# Patient Record
Sex: MALE | Race: OTHER | HISPANIC OR LATINO | URBAN - METROPOLITAN AREA
[De-identification: names, ages, dates, MRNs, and addresses within clinical notes are randomized per-mention and may not be internally consistent; named-entity substitution may affect disease eponyms.]

---

## 2017-05-16 ENCOUNTER — EMERGENCY (EMERGENCY)
Facility: HOSPITAL | Age: 37
LOS: 1 days | Discharge: HOME | End: 2017-05-16
Attending: EMERGENCY MEDICINE
Payer: SELF-PAY

## 2017-05-16 VITALS
DIASTOLIC BLOOD PRESSURE: 68 MMHG | RESPIRATION RATE: 18 BRPM | OXYGEN SATURATION: 97 % | SYSTOLIC BLOOD PRESSURE: 119 MMHG | HEIGHT: 68 IN | TEMPERATURE: 98 F | WEIGHT: 195.11 LBS | HEART RATE: 66 BPM

## 2017-05-16 DIAGNOSIS — S76.011A STRAIN OF MUSCLE, FASCIA AND TENDON OF RIGHT HIP, INITIAL ENCOUNTER: ICD-10-CM
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
## 2017-05-16 DIAGNOSIS — Z87.891 PERSONAL HISTORY OF NICOTINE DEPENDENCE: ICD-10-CM
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
## 2017-05-16 PROCEDURE — 99283 EMERGENCY DEPT VISIT LOW MDM: CPT

## 2017-05-16 PROCEDURE — 96372 THER/PROPH/DIAG INJ SC/IM: CPT

## 2017-05-16 PROCEDURE — 99283 EMERGENCY DEPT VISIT LOW MDM: CPT | Mod: 25

## 2017-05-16 RX ORDER — KETOROLAC TROMETHAMINE 30 MG/ML
30 SYRINGE (ML) INJECTION ONCE
Qty: 0 | Refills: 0 | Status: DISCONTINUED | OUTPATIENT
Start: 2017-05-16 | End: 2017-05-16

## 2017-05-16 RX ORDER — IBUPROFEN 200 MG
1 TABLET ORAL
Qty: 20 | Refills: 0 | OUTPATIENT
Start: 2017-05-16 | End: 2017-05-21

## 2017-05-16 RX ORDER — DIAZEPAM 5 MG
5 TABLET ORAL ONCE
Qty: 0 | Refills: 0 | Status: DISCONTINUED | OUTPATIENT
Start: 2017-05-16 | End: 2017-05-16

## 2017-05-16 RX ORDER — METHOCARBAMOL 500 MG/1
2 TABLET, FILM COATED ORAL
Qty: 24 | Refills: 0 | OUTPATIENT
Start: 2017-05-16 | End: 2017-05-20

## 2017-05-16 RX ADMIN — Medication 30 MILLIGRAM(S): at 13:56

## 2017-05-16 RX ADMIN — Medication 5 MILLIGRAM(S): at 13:56

## 2017-05-16 RX ADMIN — Medication 30 MILLIGRAM(S): at 15:45

## 2017-05-16 NOTE — ED PROVIDER NOTE - CONSTITUTIONAL, MLM
normal... Appears uncomfortable, well nourished, awake, alert, oriented to person, place, time/situation and in no apparent distress.

## 2017-05-16 NOTE — ED PROVIDER NOTE - OBJECTIVE STATEMENT
36 year-old male, no significant PMHx, presents with cc right groin/hip pain since yesterday. Yesterday, was lifting up refrigerators and carrying them downstairs. Felt gradual onset of progressively worsening sharp right groin pain, radiating to right lateral hip. Pain is continuous, worse with walking, hip ROM or lifting leg. No alleviating factors. Denies back pain, numbness/tingling, weakness, urinary/bowel dysfunction, testicular pain, bulging mass, increase in pain with coughing, fall or any other complaints. 36 year-old male, no significant PMHx, presents with cc right groin/hip pain since yesterday. Yesterday, was lifting up refrigerators and carrying them downstairs and bending low to get through doorway. Felt gradual onset of progressively worsening sharp right groin pain, radiating to right lateral hip. Pain is continuous, worse with walking, hip ROM or lifting leg. No alleviating factors. Denies back pain, numbness/tingling, weakness, urinary/bowel dysfunction, testicular pain, bulging mass, increase in pain with coughing, fall or any other complaints.

## 2017-05-16 NOTE — ED PROVIDER NOTE - PROGRESS NOTE DETAILS
Patient feels better. Explained to patient that patient needs to follow up with PMD and ortho through care coordinator for re-assessment and avoid work and heavy lifting until cleared by PMD or ortho. Will give Rx for IBU and RObaxin. Pt is well appearing walking with steady gait, stable for discharge and follow up without fail with medical doctor. I had a detailed discussion with the patient and/or guardian regarding the historical points, exam findings, and any diagnostic results supporting the discharge diagnosis. Pt educated on care and need for follow up. Strict return instructions and red flag signs and symptoms discussed with patient. Questions answered. Pt shows understanding of discharge information and agrees to follow.

## 2017-05-16 NOTE — ED ADULT NURSE NOTE - OBJECTIVE STATEMENT
AOX3 +ambulatory patient reports right hip/groin pain. As per patient he was lifting a fridge yesterday and hurt his groin area. Denies any GI  problems

## 2017-05-16 NOTE — ED PROVIDER NOTE - MEDICAL DECISION MAKING DETAILS
36 year-old male, presents with right groin/hip pain s/p injury at work yesterday. vital signs within normal limits, afebrile. History and findings suggestive of muscle strain/spasm. Low suspicion of hernia. Plan: Toradol, Diazepam, PMD follow up through care coordinator and discharge.

## 2017-05-16 NOTE — ED PROVIDER NOTE - ATTENDING CONTRIBUTION TO CARE
rt sided hip pain  onset while lifting refrigerator yesterday while squatting  ttp rt ischeal tuberosity  no testicle or back pain  no numbness  limited flexion/internal rotation/adduction due to pain  ambulatory  heat, NSAIDS, ortho f/u

## 2017-05-16 NOTE — ED PROVIDER NOTE - PHYSICAL EXAMINATION
Right groin TTP without swelling or lymphadenopathy. Increased pain with straight leg raise and external rotation of hip. Femoral pulses intact 2+ bilaterally. No hernia palpated. No bulging mass. Right hip lateral TTP. Painful/limited ROM of hip. No bruising. No spinal/paraspinal TTP.

## 2023-02-09 NOTE — ED ADULT TRIAGE NOTE - AS HEIGHT TYPE
"Chief Complaint   Patient presents with     Study Results       Initial There were no vitals taken for this visit. Estimated body mass index is 29.64 kg/m  as calculated from the following:    Height as of 10/20/22: 1.594 m (5' 2.75\").    Weight as of 1/6/23: 75.3 kg (166 lb).    Medication Reconciliation: andreina Hernandez CMA on 2/9/2023 at 10:22 AM     "
stated

## 2023-04-10 NOTE — ED PROVIDER NOTE - CPE EDP GASTRO NORM
[de-identified] : Patient has good range of motion to the fingers is no significant scissoring there is normal sensation normal capillary refill the nail plate is intact.
normal...
